# Patient Record
Sex: MALE | URBAN - METROPOLITAN AREA
[De-identification: names, ages, dates, MRNs, and addresses within clinical notes are randomized per-mention and may not be internally consistent; named-entity substitution may affect disease eponyms.]

---

## 2018-10-23 ENCOUNTER — APPOINTMENT (RX ONLY)
Dept: URBAN - METROPOLITAN AREA CLINIC 2 | Facility: CLINIC | Age: 53
Setting detail: DERMATOLOGY
End: 2018-10-23

## 2018-10-23 DIAGNOSIS — L40.3 PUSTULOSIS PALMARIS ET PLANTARIS: ICD-10-CM

## 2018-10-23 PROBLEM — I10 ESSENTIAL (PRIMARY) HYPERTENSION: Status: ACTIVE | Noted: 2018-10-23

## 2018-10-23 PROBLEM — E13.9 OTHER SPECIFIED DIABETES MELLITUS WITHOUT COMPLICATIONS: Status: ACTIVE | Noted: 2018-10-23

## 2018-10-23 PROCEDURE — 99202 OFFICE O/P NEW SF 15 MIN: CPT

## 2018-10-23 PROCEDURE — ? TREATMENT REGIMEN

## 2018-10-23 PROCEDURE — ? COUNSELING

## 2018-10-23 PROCEDURE — ? ADDITIONAL NOTES

## 2018-10-23 PROCEDURE — ? PRESCRIPTION

## 2018-10-23 RX ORDER — ADALIMUMAB 40MG/0.4ML
KIT SUBCUTANEOUS
Qty: 2 | Refills: 6 | Status: ERX | COMMUNITY
Start: 2018-10-23

## 2018-10-23 RX ADMIN — ADALIMUMAB: KIT at 22:18

## 2018-10-23 ASSESSMENT — LOCATION ZONE DERM
LOCATION ZONE: TOE
LOCATION ZONE: HAND

## 2018-10-23 ASSESSMENT — LOCATION DETAILED DESCRIPTION DERM
LOCATION DETAILED: LEFT ULNAR DORSAL HAND
LOCATION DETAILED: RIGHT RADIAL DORSAL HAND
LOCATION DETAILED: RIGHT DORSAL GREAT TOE

## 2018-10-23 ASSESSMENT — LOCATION SIMPLE DESCRIPTION DERM
LOCATION SIMPLE: RIGHT HAND
LOCATION SIMPLE: LEFT HAND
LOCATION SIMPLE: RIGHT GREAT TOE

## 2018-10-23 NOTE — PROCEDURE: ADDITIONAL NOTES
Detail Level: Simple
Additional Notes: Patient has pitting of the nails. 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 The patient is currently in school for [employer] system technology/cyber technology.

## 2018-10-23 NOTE — PROCEDURE: TREATMENT REGIMEN
Action 3: Continue
Detail Level: Zone
Continue Regimen: Instructed the patient to continue daily moisturizing regimen

## 2018-11-20 ENCOUNTER — APPOINTMENT (RX ONLY)
Dept: URBAN - METROPOLITAN AREA CLINIC 2 | Facility: CLINIC | Age: 53
Setting detail: DERMATOLOGY
End: 2018-11-20

## 2018-11-20 DIAGNOSIS — L40.3 PUSTULOSIS PALMARIS ET PLANTARIS: ICD-10-CM

## 2018-11-20 PROCEDURE — ? ADDITIONAL NOTES

## 2018-11-20 PROCEDURE — 99213 OFFICE O/P EST LOW 20 MIN: CPT

## 2018-11-20 PROCEDURE — ? COUNSELING

## 2018-11-20 PROCEDURE — ? TREATMENT REGIMEN

## 2018-11-20 PROCEDURE — ? PRESCRIPTION

## 2018-11-20 RX ORDER — CLOBETASOL PROPIONATE 0.5 MG/G
OINTMENT TOPICAL
Qty: 1 | Refills: 2 | Status: ERX | COMMUNITY
Start: 2018-11-20

## 2018-11-20 RX ADMIN — CLOBETASOL PROPIONATE: 0.5 OINTMENT TOPICAL at 16:41

## 2018-11-20 ASSESSMENT — LOCATION SIMPLE DESCRIPTION DERM
LOCATION SIMPLE: RIGHT GREAT TOE
LOCATION SIMPLE: RIGHT HAND
LOCATION SIMPLE: LEFT HAND

## 2018-11-20 ASSESSMENT — LOCATION DETAILED DESCRIPTION DERM
LOCATION DETAILED: RIGHT DORSAL GREAT TOE
LOCATION DETAILED: LEFT ULNAR DORSAL HAND
LOCATION DETAILED: RIGHT RADIAL DORSAL HAND

## 2018-11-20 ASSESSMENT — LOCATION ZONE DERM
LOCATION ZONE: HAND
LOCATION ZONE: TOE

## 2018-11-20 ASSESSMENT — PGA PSORIASIS: PGA PSORIASIS: MARKED (MARKED PLAQUE ELEVATION, BRIGHT ERYTHEMA, THICK NONTENACIOUS SCALE PREDOMINATES)

## 2018-11-20 ASSESSMENT — PAIN INTENSITY VAS: HOW INTENSE IS YOUR PAIN 0 BEING NO PAIN, 10 BEING THE MOST SEVERE PAIN POSSIBLE?: 8/10 PAIN

## 2018-11-20 NOTE — PROCEDURE: ADDITIONAL NOTES
Additional Notes: Patient has a history of a splenectomy. Patient sent with a lab slip to have the following drawn: Hep B, Hep C, Quantiferon TB Gold, Comp Metabolic Panel, and CBC.
Detail Level: Simple

## 2019-01-22 ENCOUNTER — APPOINTMENT (RX ONLY)
Dept: URBAN - METROPOLITAN AREA CLINIC 2 | Facility: CLINIC | Age: 54
Setting detail: DERMATOLOGY
End: 2019-01-22

## 2019-01-22 DIAGNOSIS — L40.59 OTHER PSORIATIC ARTHROPATHY: ICD-10-CM

## 2019-01-22 DIAGNOSIS — L40.0 PSORIASIS VULGARIS: ICD-10-CM

## 2019-01-22 PROCEDURE — ? OTHER

## 2019-01-22 PROCEDURE — ? COUNSELING

## 2019-01-22 PROCEDURE — 99213 OFFICE O/P EST LOW 20 MIN: CPT

## 2019-01-22 ASSESSMENT — LOCATION DETAILED DESCRIPTION DERM
LOCATION DETAILED: RIGHT RADIAL DORSAL HAND
LOCATION DETAILED: LEFT ULNAR DORSAL HAND

## 2019-01-22 ASSESSMENT — LOCATION SIMPLE DESCRIPTION DERM
LOCATION SIMPLE: LEFT HAND
LOCATION SIMPLE: RIGHT HAND

## 2019-01-22 ASSESSMENT — PGA PSORIASIS: PGA PSORIASIS: MARKED (MARKED PLAQUE ELEVATION, BRIGHT ERYTHEMA, THICK NONTENACIOUS SCALE PREDOMINATES)

## 2019-01-22 ASSESSMENT — LOCATION ZONE DERM: LOCATION ZONE: HAND

## 2019-01-22 NOTE — PROCEDURE: OTHER
Detail Level: Zone
Other (Free Text): Patient declines methotrexate due to possible side effects
Note Text (......Xxx Chief Complaint.): This diagnosis correlates with the

## 2019-02-07 ENCOUNTER — RX ONLY (OUTPATIENT)
Age: 54
Setting detail: RX ONLY
End: 2019-02-07

## 2019-02-07 RX ORDER — METHOTREXATE SODIUM 2.5 MG/1
10MG TABLET ORAL ONCE
Qty: 4 | Refills: 0 | Status: ERX | COMMUNITY
Start: 2019-02-07

## 2019-02-07 RX ORDER — FOLIC ACID 1 MG
1MG TABLET ORAL DAILY
Qty: 30 | Refills: 3 | Status: ERX | COMMUNITY
Start: 2019-02-07

## 2019-02-27 ENCOUNTER — RX ONLY (OUTPATIENT)
Age: 54
Setting detail: RX ONLY
End: 2019-02-27

## 2019-02-27 RX ORDER — METHOTREXATE SODIUM 2.5 MG/1
6 PILLS (15MG) TABLET ORAL ONCE
Qty: 6 | Refills: 0 | Status: ERX

## 2019-03-08 ENCOUNTER — RX ONLY (OUTPATIENT)
Age: 54
Setting detail: RX ONLY
End: 2019-03-08

## 2019-03-08 RX ORDER — ADALIMUMAB 40MG/0.8ML
80MG KIT SUBCUTANEOUS ONCE
Qty: 2 | Refills: 3 | Status: ERX | COMMUNITY
Start: 2019-03-08

## 2019-03-11 ENCOUNTER — RX ONLY (OUTPATIENT)
Age: 54
Setting detail: RX ONLY
End: 2019-03-11

## 2019-03-11 RX ORDER — ADALIMUMAB 4 MG/ML
1 KIT INJECTION QOW
Qty: 1 | Refills: 0 | Status: ERX

## 2019-03-11 RX ORDER — ADALIMUMAB 4 MG/ML
1 KIT INJECTION QOW
Qty: 1 | Refills: 0 | Status: CANCELLED | COMMUNITY
Start: 2019-03-11

## 2019-03-11 RX ORDER — ADALIMUMAB 40MG/0.4ML
1 KIT SUBCUTANEOUS QOW
Qty: 1 | Refills: 6 | Status: CANCELLED | COMMUNITY
Start: 2019-03-11

## 2019-04-12 ENCOUNTER — RX ONLY (OUTPATIENT)
Age: 54
Setting detail: RX ONLY
End: 2019-04-12

## 2019-04-12 RX ORDER — ADALIMUMAB 40MG/0.4ML
1 KIT SUBCUTANEOUS BID
Qty: 1 | Refills: 5 | Status: ERX | COMMUNITY
Start: 2019-04-12

## 2019-09-09 ENCOUNTER — RX ONLY (OUTPATIENT)
Age: 54
Setting detail: RX ONLY
End: 2019-09-09

## 2019-09-09 RX ORDER — ADALIMUMAB 40MG/0.4ML
1 KIT SUBCUTANEOUS BID
Qty: 1 | Refills: 0 | Status: ERX

## 2019-10-01 ENCOUNTER — APPOINTMENT (RX ONLY)
Dept: URBAN - METROPOLITAN AREA CLINIC 2 | Facility: CLINIC | Age: 54
Setting detail: DERMATOLOGY
End: 2019-10-01

## 2019-10-01 DIAGNOSIS — L40.0 PSORIASIS VULGARIS: ICD-10-CM | Status: INADEQUATELY CONTROLLED

## 2019-10-01 DIAGNOSIS — L40.59 OTHER PSORIATIC ARTHROPATHY: ICD-10-CM

## 2019-10-01 PROCEDURE — ? TREATMENT REGIMEN

## 2019-10-01 PROCEDURE — ? COUNSELING

## 2019-10-01 PROCEDURE — ? OTHER

## 2019-10-01 PROCEDURE — 99213 OFFICE O/P EST LOW 20 MIN: CPT

## 2019-10-01 ASSESSMENT — LOCATION SIMPLE DESCRIPTION DERM
LOCATION SIMPLE: RIGHT HAND
LOCATION SIMPLE: LOWER BACK
LOCATION SIMPLE: LEFT HAND

## 2019-10-01 ASSESSMENT — LOCATION DETAILED DESCRIPTION DERM
LOCATION DETAILED: LEFT RADIAL DORSAL HAND
LOCATION DETAILED: RIGHT ULNAR DORSAL HAND
LOCATION DETAILED: SACRAL SPINE

## 2019-10-01 ASSESSMENT — LOCATION ZONE DERM
LOCATION ZONE: TRUNK
LOCATION ZONE: HAND

## 2019-10-01 ASSESSMENT — BSA PSORIASIS: % BODY COVERED IN PSORIASIS: 5

## 2019-10-01 NOTE — PROCEDURE: COUNSELING
Detail Level: Zone
Quality 410: Psoriasis Clinical Response To Oral Systemic Or Biologic Medications: Patient has been on biologic or system therapy for less than 6 months
Quality 337: Tuberculosis Prevention For Psoriasis And Psoriatic Arthritis Patients On A Biological Immune Response Modifier: Patient has a documented negative annual TB screening.
Detail Level: Detailed

## 2019-10-01 NOTE — PROCEDURE: OTHER
Other (Free Text): Pt. has an appointment with Dr. Jones at [facility] on Thursday. We will wait to change therapy (Taltz is considered) to see how Dr. Jones feels about that. We will have him fill out the necessary paperwork to get it going so we can send in the prescription accordingly.
Note Text (......Xxx Chief Complaint.): This diagnosis correlates with the
Detail Level: Detailed

## 2019-10-01 NOTE — PROCEDURE: TREATMENT REGIMEN
Discontinue Regimen: Humira injections (pt. is not improving substantially-still in a lot of pain)
Action 3: Continue
Detail Level: Detailed
Plan: Taltz will be a considerable biologic change-we will wait to see what Dr. Jones says after his appointment on Thursday (10/3)

## 2019-10-08 ENCOUNTER — RX ONLY (OUTPATIENT)
Age: 54
Setting detail: RX ONLY
End: 2019-10-08

## 2019-10-08 RX ORDER — ADALIMUMAB 40MG/0.4ML
1 KIT SUBCUTANEOUS Q2WEEKS
Qty: 2 | Refills: 5 | Status: ERX

## 2019-10-10 ENCOUNTER — RX ONLY (OUTPATIENT)
Age: 54
Setting detail: RX ONLY
End: 2019-10-10

## 2019-10-10 RX ORDER — IXEKIZUMAB 80 MG/ML
80MG INJECTION, SOLUTION SUBCUTANEOUS
Qty: 4 | Refills: 2 | Status: ERX | COMMUNITY
Start: 2019-10-10

## 2019-10-15 ENCOUNTER — RX ONLY (OUTPATIENT)
Age: 54
Setting detail: RX ONLY
End: 2019-10-15

## 2019-10-15 RX ORDER — ETANERCEPT 50 MG/ML
50MG SOLUTION SUBCUTANEOUS
Qty: 8 | Refills: 2 | Status: ERX | COMMUNITY
Start: 2019-10-15